# Patient Record
Sex: FEMALE | Race: WHITE
[De-identification: names, ages, dates, MRNs, and addresses within clinical notes are randomized per-mention and may not be internally consistent; named-entity substitution may affect disease eponyms.]

---

## 2021-10-02 ENCOUNTER — HOSPITAL ENCOUNTER (INPATIENT)
Dept: HOSPITAL 94 - SUR 3N | Age: 81
LOS: 2 days | Discharge: HOME HEALTH | DRG: 487 | End: 2021-10-04
Attending: ORTHOPAEDIC SURGERY | Admitting: ORTHOPAEDIC SURGERY
Payer: MEDICARE

## 2021-10-02 VITALS — DIASTOLIC BLOOD PRESSURE: 57 MMHG | SYSTOLIC BLOOD PRESSURE: 122 MMHG

## 2021-10-02 VITALS — SYSTOLIC BLOOD PRESSURE: 142 MMHG | DIASTOLIC BLOOD PRESSURE: 62 MMHG

## 2021-10-02 VITALS — DIASTOLIC BLOOD PRESSURE: 63 MMHG | SYSTOLIC BLOOD PRESSURE: 143 MMHG

## 2021-10-02 VITALS — SYSTOLIC BLOOD PRESSURE: 155 MMHG | DIASTOLIC BLOOD PRESSURE: 72 MMHG

## 2021-10-02 VITALS — DIASTOLIC BLOOD PRESSURE: 60 MMHG | SYSTOLIC BLOOD PRESSURE: 109 MMHG

## 2021-10-02 VITALS — DIASTOLIC BLOOD PRESSURE: 58 MMHG | SYSTOLIC BLOOD PRESSURE: 127 MMHG

## 2021-10-02 VITALS — DIASTOLIC BLOOD PRESSURE: 88 MMHG | SYSTOLIC BLOOD PRESSURE: 105 MMHG

## 2021-10-02 VITALS — DIASTOLIC BLOOD PRESSURE: 57 MMHG | SYSTOLIC BLOOD PRESSURE: 138 MMHG

## 2021-10-02 VITALS — SYSTOLIC BLOOD PRESSURE: 135 MMHG | DIASTOLIC BLOOD PRESSURE: 55 MMHG

## 2021-10-02 VITALS — DIASTOLIC BLOOD PRESSURE: 58 MMHG | SYSTOLIC BLOOD PRESSURE: 149 MMHG

## 2021-10-02 VITALS — SYSTOLIC BLOOD PRESSURE: 105 MMHG | DIASTOLIC BLOOD PRESSURE: 62 MMHG

## 2021-10-02 VITALS — SYSTOLIC BLOOD PRESSURE: 130 MMHG | DIASTOLIC BLOOD PRESSURE: 53 MMHG

## 2021-10-02 VITALS — SYSTOLIC BLOOD PRESSURE: 127 MMHG | DIASTOLIC BLOOD PRESSURE: 47 MMHG

## 2021-10-02 VITALS — SYSTOLIC BLOOD PRESSURE: 137 MMHG | DIASTOLIC BLOOD PRESSURE: 59 MMHG

## 2021-10-02 VITALS — DIASTOLIC BLOOD PRESSURE: 49 MMHG | SYSTOLIC BLOOD PRESSURE: 130 MMHG

## 2021-10-02 VITALS — SYSTOLIC BLOOD PRESSURE: 138 MMHG | DIASTOLIC BLOOD PRESSURE: 57 MMHG

## 2021-10-02 VITALS — HEIGHT: 63 IN | BODY MASS INDEX: 23.04 KG/M2 | WEIGHT: 130 LBS

## 2021-10-02 VITALS — DIASTOLIC BLOOD PRESSURE: 54 MMHG | SYSTOLIC BLOOD PRESSURE: 132 MMHG

## 2021-10-02 VITALS — SYSTOLIC BLOOD PRESSURE: 145 MMHG | DIASTOLIC BLOOD PRESSURE: 50 MMHG

## 2021-10-02 VITALS — DIASTOLIC BLOOD PRESSURE: 55 MMHG | SYSTOLIC BLOOD PRESSURE: 137 MMHG

## 2021-10-02 DIAGNOSIS — Z96.659: ICD-10-CM
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
## 2021-10-02 DIAGNOSIS — E78.5: ICD-10-CM

## 2021-10-02 DIAGNOSIS — Z88.0: ICD-10-CM

## 2021-10-02 DIAGNOSIS — Z23: ICD-10-CM

## 2021-10-02 DIAGNOSIS — Z20.822: ICD-10-CM

## 2021-10-02 DIAGNOSIS — M65.861: ICD-10-CM

## 2021-10-02 DIAGNOSIS — T84.53XA: Primary | ICD-10-CM

## 2021-10-02 DIAGNOSIS — M54.50: ICD-10-CM

## 2021-10-02 DIAGNOSIS — M81.0: ICD-10-CM

## 2021-10-02 DIAGNOSIS — Z88.2: ICD-10-CM

## 2021-10-02 DIAGNOSIS — M93.261: ICD-10-CM

## 2021-10-02 DIAGNOSIS — E03.9: ICD-10-CM
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
## 2021-10-02 LAB
ALBUMIN SERPL BCP-MCNC: 2.6 G/DL (ref 3.4–5)
ALBUMIN/GLOB SERPL: 0.7 {RATIO} (ref 1.1–1.5)
ALP SERPL-CCNC: 120 IU/L (ref 46–116)
ALT SERPL W P-5'-P-CCNC: 26 U/L (ref 30–65)
ANION GAP SERPL CALCULATED.3IONS-SCNC: 11 MMOL/L (ref 8–16)
APPEARANCE SNV: (no result)
APTT PPP: 27 SECONDS (ref 22–32)
AST SERPL W P-5'-P-CCNC: 19 U/L (ref 10–37)
BASOPHILS # BLD AUTO: 0 X10'3 (ref 0–0.2)
BASOPHILS NFR BLD AUTO: 0.4 % (ref 0–1)
BILIRUB SERPL-MCNC: 0.5 MG/DL (ref 0–1)
BUN SERPL-MCNC: 10 MG/DL (ref 7–18)
BUN/CREAT SERPL: 13 (ref 6.6–38)
CALCIUM SERPL-MCNC: 8.7 MG/DL (ref 8.5–10.1)
CHLORIDE SERPL-SCNC: 105 MMOL/L (ref 99–107)
CO2 SERPL-SCNC: 24.8 MMOL/L (ref 24–32)
COLOR SNV: (no result)
CREAT SERPL-MCNC: 0.77 MG/DL (ref 0.4–0.9)
CRP SERPL HS-MCNC: 12.84 MG/DL (ref 0–0.5)
EOSINOPHIL # BLD AUTO: 0.1 X10'3 (ref 0–0.9)
EOSINOPHIL NFR BLD AUTO: 1.1 % (ref 0–6)
ERYTHROCYTE [DISTWIDTH] IN BLOOD BY AUTOMATED COUNT: 13 % (ref 11.5–14.5)
GFR SERPL CREATININE-BSD FRML MDRD: 72 ML/MIN
GLUCOSE SERPL-MCNC: 100 MG/DL (ref 70–104)
HCT VFR BLD AUTO: 29.8 % (ref 35–45)
HGB BLD-MCNC: 10.4 G/DL (ref 12–16)
LYMPHOCYTES # BLD AUTO: 1 X10'3 (ref 1.1–4.8)
LYMPHOCYTES NFR BLD AUTO: 14.3 % (ref 21–51)
LYMPHOCYTES NFR SNV: 8 % (ref 0–75)
MCH RBC QN AUTO: 32.8 PG (ref 27–31)
MCHC RBC AUTO-ENTMCNC: 35 G/DL (ref 33–36.5)
MCV RBC AUTO: 93.6 FL (ref 78–98)
MONOCYTES # BLD AUTO: 0.7 X10'3 (ref 0–0.9)
MONOCYTES NFR BLD AUTO: 10.6 % (ref 2–12)
MONOCYTES NFR SNV MANUAL: 2 % (ref 0–0)
NEUTROPHILS # BLD AUTO: 5 X10'3 (ref 1.8–7.7)
NEUTROPHILS NFR BLD AUTO: 73.6 % (ref 42–75)
NEUTROPHILS NFR SNV MANUAL: 90 % (ref 0–25)
PLATELET # BLD AUTO: 367 X10'3 (ref 140–440)
PMV BLD AUTO: 5.9 FL (ref 7.4–10.4)
POTASSIUM SERPL-SCNC: 3.9 MMOL/L (ref 3.4–5.1)
PROT SERPL-MCNC: 6.3 G/DL (ref 6.4–8.2)
RBC # BLD AUTO: 3.18 X10'6 (ref 4.2–5.6)
RBC # SNV: (no result) /CU MM
SODIUM SERPL-SCNC: 141 MMOL/L (ref 135–145)
WBC # SNV MANUAL: (no result) /CU MM (ref 0–200)

## 2021-10-02 PROCEDURE — 36415 COLL VENOUS BLD VENIPUNCTURE: CPT

## 2021-10-02 PROCEDURE — 85025 COMPLETE CBC W/AUTO DIFF WBC: CPT

## 2021-10-02 PROCEDURE — 80051 ELECTROLYTE PANEL: CPT

## 2021-10-02 PROCEDURE — 87070 CULTURE OTHR SPECIMN AEROBIC: CPT

## 2021-10-02 PROCEDURE — 3E0U029 INTRODUCTION OF OTHER ANTI-INFECTIVE INTO JOINTS, OPEN APPROACH: ICD-10-PCS | Performed by: ORTHOPAEDIC SURGERY

## 2021-10-02 PROCEDURE — 87081 CULTURE SCREEN ONLY: CPT

## 2021-10-02 PROCEDURE — 0S9C3ZZ DRAINAGE OF RIGHT KNEE JOINT, PERCUTANEOUS APPROACH: ICD-10-PCS | Performed by: ORTHOPAEDIC SURGERY

## 2021-10-02 PROCEDURE — 85610 PROTHROMBIN TIME: CPT

## 2021-10-02 PROCEDURE — 97530 THERAPEUTIC ACTIVITIES: CPT

## 2021-10-02 PROCEDURE — 85730 THROMBOPLASTIN TIME PARTIAL: CPT

## 2021-10-02 PROCEDURE — 87635 SARS-COV-2 COVID-19 AMP PRB: CPT

## 2021-10-02 PROCEDURE — 86900 BLOOD TYPING SEROLOGIC ABO: CPT

## 2021-10-02 PROCEDURE — 82565 ASSAY OF CREATININE: CPT

## 2021-10-02 PROCEDURE — 86140 C-REACTIVE PROTEIN: CPT

## 2021-10-02 PROCEDURE — 85651 RBC SED RATE NONAUTOMATED: CPT

## 2021-10-02 PROCEDURE — 86901 BLOOD TYPING SEROLOGIC RH(D): CPT

## 2021-10-02 PROCEDURE — 89051 BODY FLUID CELL COUNT: CPT

## 2021-10-02 PROCEDURE — 87075 CULTR BACTERIA EXCEPT BLOOD: CPT

## 2021-10-02 PROCEDURE — 93005 ELECTROCARDIOGRAM TRACING: CPT

## 2021-10-02 PROCEDURE — 86885 COOMBS TEST INDIRECT QUAL: CPT

## 2021-10-02 PROCEDURE — 97116 GAIT TRAINING THERAPY: CPT

## 2021-10-02 PROCEDURE — 97161 PT EVAL LOW COMPLEX 20 MIN: CPT

## 2021-10-02 PROCEDURE — 0SPC09Z REMOVAL OF LINER FROM RIGHT KNEE JOINT, OPEN APPROACH: ICD-10-PCS | Performed by: ORTHOPAEDIC SURGERY

## 2021-10-02 PROCEDURE — 82948 REAGENT STRIP/BLOOD GLUCOSE: CPT

## 2021-10-02 PROCEDURE — 0SUV09Z SUPPLEMENT RIGHT KNEE JOINT, TIBIAL SURFACE WITH LINER, OPEN APPROACH: ICD-10-PCS | Performed by: ORTHOPAEDIC SURGERY

## 2021-10-02 PROCEDURE — 36573 INSJ PICC RS&I 5 YR+: CPT

## 2021-10-02 PROCEDURE — 80053 COMPREHEN METABOLIC PANEL: CPT

## 2021-10-02 RX ADMIN — CEFAZOLIN SODIUM SCH MLS/HR: 1 SOLUTION INTRAVENOUS at 15:14

## 2021-10-02 RX ADMIN — SODIUM CHLORIDE, SODIUM LACTATE, POTASSIUM CHLORIDE, AND CALCIUM CHLORIDE SCH MLS/HR: .6; .31; .03; .02 INJECTION, SOLUTION INTRAVENOUS at 09:38

## 2021-10-02 RX ADMIN — SODIUM CHLORIDE, SODIUM LACTATE, POTASSIUM CHLORIDE, AND CALCIUM CHLORIDE SCH MLS/HR: .6; .31; .03; .02 INJECTION, SOLUTION INTRAVENOUS at 22:38

## 2021-10-02 RX ADMIN — SODIUM CHLORIDE AND POTASSIUM CHLORIDE SCH MLS/HR: 4.5; 1.49 INJECTION, SOLUTION INTRAVENOUS at 14:12

## 2021-10-02 NOTE — NUR
Received from OR via , accompanied by Anesthesiologist DR LIN and report given by 
Anesthesiolgist. PT PRESNETS WITH 20G RIGHT HAND, VELIA DRAIN RIGHT KNEE, VSS.

-------------------------------------------------------------------------------

Addendum: 10/02/21 at 1240 by Lizet Linda RN, RN

-------------------------------------------------------------------------------

Amended: Links added.

## 2021-10-02 NOTE — NUR
Problems reprioritized. Patient report given, questions answered & plan of care reviewed 
with dg taylor.

## 2021-10-02 NOTE — NUR
Report called to receiving nurse, LYNN REYNA.  [facility], PT 
HOOKED UP TO MONITORS, BED IN LOW LOCKED POSITION, PT GIVEN CALL LIGHT, CHARGE NURSE 
NOTIFIED PT IN ROOM. VSS.

-------------------------------------------------------------------------------

Addendum: 10/02/21 at 1335 by Lizet Linda RN, RN

-------------------------------------------------------------------------------

Amended: Links added.

## 2021-10-02 NOTE — NUR
Supervisor Valarie called me, according to her patient has deja ring that patient gave to 
her son at bedside. I went to patient room to ask if she has any jewelry, she said yes. One 
of the son said "yes, I have it here (pointing on his pocket)"

## 2021-10-02 NOTE — NUR
Patient in room [facility]. I have received report from Lizet REYNA   and had the opportunity 
to ask questions and assume patient care.

## 2021-10-03 VITALS — DIASTOLIC BLOOD PRESSURE: 77 MMHG | SYSTOLIC BLOOD PRESSURE: 144 MMHG

## 2021-10-03 VITALS — SYSTOLIC BLOOD PRESSURE: 150 MMHG | DIASTOLIC BLOOD PRESSURE: 59 MMHG

## 2021-10-03 VITALS — SYSTOLIC BLOOD PRESSURE: 136 MMHG | DIASTOLIC BLOOD PRESSURE: 50 MMHG

## 2021-10-03 VITALS — DIASTOLIC BLOOD PRESSURE: 73 MMHG | SYSTOLIC BLOOD PRESSURE: 148 MMHG

## 2021-10-03 VITALS — DIASTOLIC BLOOD PRESSURE: 78 MMHG | SYSTOLIC BLOOD PRESSURE: 137 MMHG

## 2021-10-03 LAB
ANION GAP SERPL CALCULATED.3IONS-SCNC: 8 MMOL/L (ref 8–16)
BASOPHILS # BLD AUTO: 0 X10'3 (ref 0–0.2)
BASOPHILS NFR BLD AUTO: 0.6 % (ref 0–1)
CHLORIDE SERPL-SCNC: 104 MMOL/L (ref 99–107)
CO2 SERPL-SCNC: 24.5 MMOL/L (ref 24–32)
CREAT SERPL-MCNC: 0.63 MG/DL (ref 0.4–0.9)
EOSINOPHIL # BLD AUTO: 0.2 X10'3 (ref 0–0.9)
EOSINOPHIL NFR BLD AUTO: 2.8 % (ref 0–6)
ERYTHROCYTE [DISTWIDTH] IN BLOOD BY AUTOMATED COUNT: 12.9 % (ref 11.5–14.5)
GFR SERPL CREATININE-BSD FRML MDRD: > 90 ML/MIN
HCT VFR BLD AUTO: 25.1 % (ref 35–45)
HGB BLD-MCNC: 8.7 G/DL (ref 12–16)
LYMPHOCYTES # BLD AUTO: 1.5 X10'3 (ref 1.1–4.8)
LYMPHOCYTES NFR BLD AUTO: 23.6 % (ref 21–51)
MCH RBC QN AUTO: 32.7 PG (ref 27–31)
MCHC RBC AUTO-ENTMCNC: 34.5 G/DL (ref 33–36.5)
MCV RBC AUTO: 94.8 FL (ref 78–98)
MONOCYTES # BLD AUTO: 0.6 X10'3 (ref 0–0.9)
MONOCYTES NFR BLD AUTO: 9.3 % (ref 2–12)
NEUTROPHILS # BLD AUTO: 3.9 X10'3 (ref 1.8–7.7)
NEUTROPHILS NFR BLD AUTO: 63.7 % (ref 42–75)
PLATELET # BLD AUTO: 376 X10'3 (ref 140–440)
PMV BLD AUTO: 6.2 FL (ref 7.4–10.4)
POTASSIUM SERPL-SCNC: 4 MMOL/L (ref 3.5–5.1)
RBC # BLD AUTO: 2.65 X10'6 (ref 4.2–5.6)
SODIUM SERPL-SCNC: 136 MMOL/L (ref 135–145)
WBC # BLD AUTO: 6.2 X10'3 (ref 4.5–11)

## 2021-10-03 RX ADMIN — CEFAZOLIN SODIUM SCH MLS/HR: 1 SOLUTION INTRAVENOUS at 16:40

## 2021-10-03 RX ADMIN — HYDROCODONE BITARTRATE AND ACETAMINOPHEN PRN TAB: 10; 325 TABLET ORAL at 09:09

## 2021-10-03 RX ADMIN — HYDROCODONE BITARTRATE AND ACETAMINOPHEN PRN TAB: 10; 325 TABLET ORAL at 14:14

## 2021-10-03 RX ADMIN — MESALAMINE SCH GM: 1.2 TABLET, DELAYED RELEASE ORAL at 09:06

## 2021-10-03 RX ADMIN — SODIUM CHLORIDE AND POTASSIUM CHLORIDE SCH MLS/HR: 4.5; 1.49 INJECTION, SOLUTION INTRAVENOUS at 07:12

## 2021-10-03 RX ADMIN — SODIUM CHLORIDE AND POTASSIUM CHLORIDE SCH MLS/HR: 4.5; 1.49 INJECTION, SOLUTION INTRAVENOUS at 20:30

## 2021-10-03 RX ADMIN — SODIUM CHLORIDE AND POTASSIUM CHLORIDE SCH MLS/HR: 4.5; 1.49 INJECTION, SOLUTION INTRAVENOUS at 14:18

## 2021-10-03 RX ADMIN — LEVOTHYROXINE SODIUM SCH MCG: 75 TABLET ORAL at 09:06

## 2021-10-03 RX ADMIN — CEFAZOLIN SODIUM SCH MLS/HR: 1 SOLUTION INTRAVENOUS at 09:05

## 2021-10-03 RX ADMIN — SODIUM CHLORIDE AND POTASSIUM CHLORIDE SCH MLS/HR: 4.5; 1.49 INJECTION, SOLUTION INTRAVENOUS at 07:23

## 2021-10-03 RX ADMIN — CEFAZOLIN SODIUM SCH MLS/HR: 1 SOLUTION INTRAVENOUS at 01:14

## 2021-10-03 NOTE — NUR
Problems reprioritized. Patient report given, questions answered & plan of care reviewed 
with Jeinfer RN.

## 2021-10-03 NOTE — NUR
Patient in room [facility]. I have received report from HARLEY   and had the opportunity to ask 
questions and assume patient care.

## 2021-10-03 NOTE — NUR
Patient in room [facility]. I have received report from AXEL More and had the opportunity to 
ask questions and assume patient care.

## 2021-10-04 VITALS — SYSTOLIC BLOOD PRESSURE: 131 MMHG | DIASTOLIC BLOOD PRESSURE: 49 MMHG

## 2021-10-04 VITALS — SYSTOLIC BLOOD PRESSURE: 119 MMHG | DIASTOLIC BLOOD PRESSURE: 57 MMHG

## 2021-10-04 LAB
BASOPHILS # BLD AUTO: 0 X10'3 (ref 0–0.2)
BASOPHILS NFR BLD AUTO: 0.6 % (ref 0–1)
EOSINOPHIL # BLD AUTO: 0.2 X10'3 (ref 0–0.9)
EOSINOPHIL NFR BLD AUTO: 3.4 % (ref 0–6)
ERYTHROCYTE [DISTWIDTH] IN BLOOD BY AUTOMATED COUNT: 13.1 % (ref 11.5–14.5)
HCT VFR BLD AUTO: 27.1 % (ref 35–45)
HGB BLD-MCNC: 9.1 G/DL (ref 12–16)
LYMPHOCYTES # BLD AUTO: 1 X10'3 (ref 1.1–4.8)
LYMPHOCYTES NFR BLD AUTO: 13.3 % (ref 21–51)
MCH RBC QN AUTO: 32.5 PG (ref 27–31)
MCHC RBC AUTO-ENTMCNC: 33.8 G/DL (ref 33–36.5)
MCV RBC AUTO: 96.3 FL (ref 78–98)
MONOCYTES # BLD AUTO: 0.5 X10'3 (ref 0–0.9)
MONOCYTES NFR BLD AUTO: 7.2 % (ref 2–12)
NEUTROPHILS # BLD AUTO: 5.5 X10'3 (ref 1.8–7.7)
NEUTROPHILS NFR BLD AUTO: 75.5 % (ref 42–75)
PLATELET # BLD AUTO: 457 X10'3 (ref 140–440)
PMV BLD AUTO: 6.4 FL (ref 7.4–10.4)
RBC # BLD AUTO: 2.81 X10'6 (ref 4.2–5.6)
WBC # BLD AUTO: 7.3 X10'3 (ref 4.5–11)

## 2021-10-04 PROCEDURE — 3E0T3BZ INTRODUCTION OF ANESTHETIC AGENT INTO PERIPHERAL NERVES AND PLEXI, PERCUTANEOUS APPROACH: ICD-10-PCS | Performed by: ANESTHESIOLOGY

## 2021-10-04 PROCEDURE — B548ZZA ULTRASONOGRAPHY OF SUPERIOR VENA CAVA, GUIDANCE: ICD-10-PCS | Performed by: ORTHOPAEDIC SURGERY

## 2021-10-04 PROCEDURE — 3E02340 INTRODUCTION OF INFLUENZA VACCINE INTO MUSCLE, PERCUTANEOUS APPROACH: ICD-10-PCS | Performed by: ORTHOPAEDIC SURGERY

## 2021-10-04 PROCEDURE — 02HV33Z INSERTION OF INFUSION DEVICE INTO SUPERIOR VENA CAVA, PERCUTANEOUS APPROACH: ICD-10-PCS | Performed by: ORTHOPAEDIC SURGERY

## 2021-10-04 RX ADMIN — HYDROCODONE BITARTRATE AND ACETAMINOPHEN PRN TAB: 10; 325 TABLET ORAL at 18:46

## 2021-10-04 RX ADMIN — MESALAMINE SCH GM: 1.2 TABLET, DELAYED RELEASE ORAL at 07:45

## 2021-10-04 RX ADMIN — HYDROCODONE BITARTRATE AND ACETAMINOPHEN PRN TAB: 10; 325 TABLET ORAL at 04:46

## 2021-10-04 RX ADMIN — CEFAZOLIN SODIUM SCH MLS/HR: 1 SOLUTION INTRAVENOUS at 00:44

## 2021-10-04 RX ADMIN — CEFAZOLIN SODIUM SCH MLS/HR: 1 SOLUTION INTRAVENOUS at 07:44

## 2021-10-04 RX ADMIN — LEVOTHYROXINE SODIUM SCH MCG: 75 TABLET ORAL at 07:45

## 2021-10-04 NOTE — NUR
Problems reprioritized. Patient report given, questions answered & plan of care reviewed 
with SUELLEN.

## 2021-10-04 NOTE — NUR
Problems reprioritized. Patient report given, questions answered & plan of care reviewed 
with Idania REYNA.

## 2021-10-04 NOTE — NUR
Discharge instructions given to patient and son present at bedside. Patient and son 
verbalized understanding of all instructions made including PICC line home care, VELIA drain 
and recording of output, follow up with Dr. Regan, when to call the physician, signs and 
symptoms to watch for, OnQ ball teaching and how to remove it,. Patient told me she still 
has [place] at home that she can take for pain. I also encouraged patient to call Dr. Regan 
for any problem or if she need more pain medicine. Patient has a brown bag at bedside with 
PICC line supplies and IV antibiotic. Patient and son was informed that a home health nurse 
will follow up to set up appointment for home visit and that  already arranged 
this.

-------------------------------------------------------------------------------

Addendum: 10/04/21 at 1845 by Jennie Rodriguez RN

-------------------------------------------------------------------------------

Late entry:

Patient received a new and full OnQ ball. Patient has been educated on how to use it and 
verbalized understanding